# Patient Record
Sex: MALE
[De-identification: names, ages, dates, MRNs, and addresses within clinical notes are randomized per-mention and may not be internally consistent; named-entity substitution may affect disease eponyms.]

---

## 2017-05-26 ENCOUNTER — HOSPITAL ENCOUNTER (EMERGENCY)
Dept: HOSPITAL 75 - ER | Age: 22
Discharge: HOME | End: 2017-05-26
Payer: SELF-PAY

## 2017-05-26 VITALS — HEIGHT: 71 IN | WEIGHT: 200 LBS | BODY MASS INDEX: 28 KG/M2

## 2017-05-26 VITALS — DIASTOLIC BLOOD PRESSURE: 88 MMHG | SYSTOLIC BLOOD PRESSURE: 130 MMHG

## 2017-05-26 DIAGNOSIS — J06.9: Primary | ICD-10-CM

## 2017-05-26 LAB
ALBUMIN SERPL-MCNC: 4.3 G/DL (ref 3.2–4.5)
ALT SERPL-CCNC: 36 U/L (ref 0–55)
ANION GAP SERPL CALC-SCNC: 11 MMOL/L (ref 5–14)
AST SERPL-CCNC: 32 U/L (ref 5–34)
BASOPHILS # BLD AUTO: 0 10^3/UL (ref 0–0.1)
BASOPHILS NFR BLD AUTO: 0 % (ref 0–10)
BILIRUB SERPL-MCNC: 0.6 MG/DL (ref 0.1–1)
BUN SERPL-MCNC: 16 MG/DL (ref 7–18)
BUN/CREAT SERPL: 17
CALCIUM SERPL-MCNC: 9.4 MG/DL (ref 8.5–10.1)
CHLORIDE SERPL-SCNC: 102 MMOL/L (ref 98–107)
CO2 SERPL-SCNC: 23 MMOL/L (ref 21–32)
CREAT SERPL-MCNC: 0.95 MG/DL (ref 0.6–1.3)
EOSINOPHIL # BLD AUTO: 0 10^3/UL (ref 0–0.3)
EOSINOPHIL NFR BLD AUTO: 0 % (ref 0–10)
ERYTHROCYTE [DISTWIDTH] IN BLOOD BY AUTOMATED COUNT: 13.8 % (ref 10–14.5)
GFR SERPLBLD BASED ON 1.73 SQ M-ARVRAT: > 60 ML/MIN
GLUCOSE SERPL-MCNC: 105 MG/DL (ref 70–105)
LYMPHOCYTES # BLD AUTO: 0.9 X 10^3 (ref 1–4)
LYMPHOCYTES NFR BLD AUTO: 8 % (ref 12–44)
MCH RBC QN AUTO: 30 PG (ref 25–34)
MCHC RBC AUTO-ENTMCNC: 34 G/DL (ref 32–36)
MCV RBC AUTO: 87 FL (ref 80–99)
MONOCYTES # BLD AUTO: 1.5 X 10^3 (ref 0–1)
MONOCYTES NFR BLD AUTO: 14 % (ref 0–12)
NEUTROPHILS # BLD AUTO: 8.4 X 10^3 (ref 1.8–7.8)
NEUTROPHILS NFR BLD AUTO: 78 % (ref 42–75)
NEUTS BAND NFR BLD MANUAL: 80 %
NEUTS BAND NFR BLD: 1 %
PLATELET # BLD: 248 10^3/UL (ref 130–400)
PMV BLD AUTO: 10.1 FL (ref 7.4–10.4)
POTASSIUM SERPL-SCNC: 3.7 MMOL/L (ref 3.6–5)
PROT SERPL-MCNC: 7.4 G/DL (ref 6.4–8.2)
RBC # BLD AUTO: 5.15 10^6/UL (ref 4.35–5.85)
SODIUM SERPL-SCNC: 136 MMOL/L (ref 135–145)
VARIANT LYMPHS NFR BLD MANUAL: 6 %
WBC # BLD AUTO: 10.7 10^3/UL (ref 4.3–11)

## 2017-05-26 PROCEDURE — 36415 COLL VENOUS BLD VENIPUNCTURE: CPT

## 2017-05-26 PROCEDURE — 80053 COMPREHEN METABOLIC PANEL: CPT

## 2017-05-26 PROCEDURE — 99282 EMERGENCY DEPT VISIT SF MDM: CPT

## 2017-05-26 PROCEDURE — 85027 COMPLETE CBC AUTOMATED: CPT

## 2017-05-26 PROCEDURE — 87430 STREP A AG IA: CPT

## 2017-05-26 PROCEDURE — 85007 BL SMEAR W/DIFF WBC COUNT: CPT

## 2017-05-26 NOTE — ED GENERAL
General


Chief Complaint:  Oral/Throat Problems


Stated Complaint:  SORE THROAT BODYACHES CHILLS


Nursing Triage Note:  


Ambulatory to ED 10 with reports of body aches, sore throat, and chills since 


last night. Patient denies fever at home.


Nursing Sepsis Screen:  No Definite Risk


Source of Information:  Patient


Exam Limitations:  No Limitations





History of Present Illness


Time Seen by Provider:  08:22


Initial Comments


The patient is a 21-year-old male who presents with the chief complaint of sore 

throat and chills and body ache.  This was noted last week right after he got 

off work.  He describes chills and rigors.  He has not taken his temperature.  

He reports it is difficult to swallow because of the sore throat.  He states 

that his sister with whom he lives had a respiratory illness last week.  He has 

not been exposed to unusual heat at work.  He is also not exposed to ticks.


Timing/Duration:  12-24 Hours


Associated Systoms:  Fever/Chills, Malaise, Other (myalgia)





Allergies and Home Medications


Allergies


Coded Allergies:  


     No Known Drug Allergies (Unverified , 5/26/17)





Home Medications


Ibuprofen 200 Mg Capsule, 200-400 MG PO BID PRN for PAIN-MILD TO MODERATE, (

Reported)





Constitutional:  see HPI


EENTM:  throat pain


Respiratory:  no symptoms reported


Cardiovascular:  no symptoms reported


Gastrointestinal:  no symptoms reported


Genitourinary:  no symptoms reported


Musculoskeletal:  muscle pain, muscle stiffness


Skin:  no symptoms reported


Psychiatric/Neurological:  No Symptoms Reported





Past Medical-Social-Family Hx


Patient Social History


Alcohol Use:  Occasionally Uses


Recreational Drug Use:  Yes


Drug of Choice:  Marijuana


Smoking Status:  Never a Smoker


2nd Hand Smoke Exposure:  No


Recent Foreign Travel:  No


Contact w/Someone Who Travel:  No


Recent Infectious Disease Expo:  No


Recent Hopitalizations:  No





Immunizations Up To Date


Tetanus Booster (TDap):  Less than 5yrs





Seasonal Allergies


Seasonal Allergies:  No





Physical Exam


Vital Signs





Vital Sign - Last 12Hours








 5/26/17





 07:28


 


Temp 99.1


 


Pulse 88


 


Resp 16


 


B/P (MAP) 134/90


 


Pulse Ox 97


 


O2 Delivery Room Air





Capillary Refill : Less Than 3 Seconds


General Appearance:  Mild Distress


Eyes:  Bilateral Eye Normal Inspection


HEENT:  Tonsillar Exudate, Tonsillar Enlargement


Neck:  Normal Inspection


Respiratory:  Chest Non Tender, Lungs Clear, Normal Breath Sounds, No Accessory 

Muscle Use, No Respiratory Distress


Gastrointestinal:  Normal Bowel Sounds


Back:  Normal Inspection, No CVA Tenderness, No Vertebral Tenderness


Extremity:  Normal Capillary Refill, Normal Inspection, Normal Range of Motion, 

Non Tender, No Calf Tenderness, No Pedal Edema


Skin:  Normal Color, Warm/Dry


Lymphatic:  No Adenopathy





Progress/Results/Core Measures


Results/Orders


Lab Results





Laboratory Tests








Test


  5/26/17


07:30 5/26/17


08:36 Range/Units


 


 


White Blood Count


  10.7 


  


  4.3-11.0


10^3/uL


 


Red Blood Count


  5.15 


  


  4.35-5.85


10^6/uL


 


Hemoglobin 15.4   13.3-17.7  G/DL


 


Hematocrit 45   40-54  %


 


Mean Corpuscular Volume 87   80-99  FL


 


Mean Corpuscular Hemoglobin 30   25-34  PG


 


Mean Corpuscular Hemoglobin


Concent 34 


  


  32-36  G/DL


 


 


Red Cell Distribution Width 13.8   10.0-14.5  %


 


Platelet Count


  248 


  


  130-400


10^3/uL


 


Mean Platelet Volume 10.1   7.4-10.4  FL


 


Neutrophils (%) (Auto) 78 H  42-75  %


 


Lymphocytes (%) (Auto) 8 L  12-44  %


 


Monocytes (%) (Auto) 14 H  0-12  %


 


Eosinophils (%) (Auto) 0   0-10  %


 


Basophils (%) (Auto) 0   0-10  %


 


Neutrophils # (Auto) 8.4 H  1.8-7.8  X 10^3


 


Lymphocytes # (Auto) 0.9 L  1.0-4.0  X 10^3


 


Monocytes # (Auto) 1.5 H  0.0-1.0  X 10^3


 


Eosinophils # (Auto)


  0.0 


  


  0.0-0.3


10^3/uL


 


Basophils # (Auto)


  0.0 


  


  0.0-0.1


10^3/uL


 


Neutrophils % (Manual) 80    %


 


Lymphocytes % (Manual) 6    %


 


Monocytes % (Manual) 13    %


 


Band Neutrophils 1    %


 


Toxic Granulation 1+    


 


Sodium Level 136   135-145  MMOL/L


 


Potassium Level 3.7   3.6-5.0  MMOL/L


 


Chloride Level 102     MMOL/L


 


Carbon Dioxide Level 23   21-32  MMOL/L


 


Anion Gap 11   5-14  MMOL/L


 


Blood Urea Nitrogen 16   7-18  MG/DL


 


Creatinine


  0.95 


  


  0.60-1.30


MG/DL


 


Estimat Glomerular Filtration


Rate > 60 


  


   


 


 


BUN/Creatinine Ratio 17    


 


Glucose Level 105     MG/DL


 


Calcium Level 9.4   8.5-10.1  MG/DL


 


Total Bilirubin 0.6   0.1-1.0  MG/DL


 


Aspartate Amino Transf


(AST/SGOT) 32 


  


  5-34  U/L


 


 


Alanine Aminotransferase


(ALT/SGPT) 36 


  


  0-55  U/L


 


 


Alkaline Phosphatase 35 L    U/L


 


Total Protein 7.4   6.4-8.2  G/DL


 


Albumin 4.3   3.2-4.5  G/DL


 


Group A Streptococcus Screen  NEGATIVE  NEGATIVE  








My Orders





Orders - EL MCDOWELL MD


Cbc With Automated Diff (5/26/17 07:24)


Comprehensive Metabolic Panel (5/26/17 07:24)


Manual Differential (5/26/17 07:30)


Rapid Strep A Screen (5/26/17 08:21)





Vital Signs/I&O





Vital Sign - Last 12Hours








 5/26/17





 07:28


 


Temp 99.1


 


Pulse 88


 


Resp 16


 


B/P (MAP) 134/90


 


Pulse Ox 97


 


O2 Delivery Room Air














Blood Pressure Mean:  105











Departure


Communication


Progress Notes


Lab and rapid strep are negative





Impression


Impression:  


 Primary Impression:  


 pharyngitis


 Additional Impression:  


 viral URI


Disposition:  01 HOME, SELF-CARE


Condition:  Stable/Unchanged





Departure-Patient Inst.


Referrals:  


NO,LOCAL PHYSICIAN (PCP/Family)


Primary Care Physician


Patient Instructions:  Viral Pharyngitis  (DC)





Add. Discharge Instructions:  


All discharge instructions reviewed with patient and/or family. Voiced 

understanding.


Use ibuprofen 600 mg 4 times daily or Tylenol 1000 mg 4 times daily for fever 

aches and pains.


Rest


Lots of liquids


Cepastat or Chloraseptic throat spray or halls throat lozenges











EL MCDOWELL MD May 26, 2017 08:25

## 2021-05-17 ENCOUNTER — HOSPITAL ENCOUNTER (OUTPATIENT)
Dept: HOSPITAL 75 - PREOP | Age: 26
Discharge: HOME | End: 2021-05-17
Attending: OTOLARYNGOLOGY
Payer: COMMERCIAL

## 2021-05-17 DIAGNOSIS — Z01.818: Primary | ICD-10-CM
